# Patient Record
Sex: MALE | Race: OTHER | NOT HISPANIC OR LATINO | URBAN - METROPOLITAN AREA
[De-identification: names, ages, dates, MRNs, and addresses within clinical notes are randomized per-mention and may not be internally consistent; named-entity substitution may affect disease eponyms.]

---

## 2017-03-14 NOTE — PATIENT DISCUSSION
1.  Corneal foreign body OS:    piece of metal and rust ring present centrally. .  Risks benefits alternatives to foreign body removal was discussed with the patient. Embedded material was removed from the cornea with a combination of a FB spud and [place] to remove rust.      Besivance instilled in office and Pressure patch with bacitracin rios applied. Keep patch on for nite and remove in AM and start Besivance every 2 yrs. RTN 1 day OC.

## 2017-03-15 NOTE — PATIENT DISCUSSION
1.  Corneal abasion centrally from FB removal.  1 edema. Cont with Besivance q3h and Rx lotemax tid OS and no rubbing. Tears alot Pres free tears every 2-3 hrs.   2.  RTN 4 days OC

## 2017-03-20 NOTE — PATIENT DISCUSSION
1.  Corneal abasion centrally from FB removal.  2 edema. Needs to start Besivance q3h and Rx pred forte tid OS and no rubbing. Tears alot Pres free tears every 2-3 hrs. Called meds into pharmacy again. PT will call if he cannot get them. 2.  RTN 4 days OC3/21/17  Called 2 meds into DR DIEUDONNE AGUILERA [facility] rd [phone].   12pm pjo

## 2017-03-29 NOTE — PATIENT DISCUSSION
1.  Corneal abrasion centrally from FB removal.  1-2 edema. Vision has dropped. CPT can dc Besivance and cont  pred forte qid OS and no rubbing. Tears alot Pres free tears every 2-3 hrs. Pt not using since he says it does not help. Pt has only used the pred forte for 3-4 days. Says he could not get them from the pharmacy.   2.  RTN 10 days OC- if no better dilate and refer to Cornea spec

## 2017-04-07 NOTE — PATIENT DISCUSSION
1.  Corneal defect centrally from FB removal.  edema. Vision is affected by edema. Cont  pred forte qid OS and no rubbing. Tears alot Pres free tears every 2-3 hrs. Pt not using since he says it does not help. Pt says he had a hard time getting  pred forte from pharmacy so did not start it promptly.   Refer to DR Ruby Rivera for eval of cornea

## 2017-05-25 NOTE — PATIENT DISCUSSION
1.  Central Corneal Scar OS -  Restart PRED 1% 3x/d. Hopefully scar will fade and shape will improve over time may take months. Transplant not indicated at this time. T/c diagnostic RGP fit if BSCVA does not improve. 2. Return for an appointment in 3 weeks for office call. MRx and Topography. with Dr. Denver Curb.

## 2017-06-15 NOTE — PATIENT DISCUSSION
Central Corneal Scar OS -  Improving on drops induced astigmatism from scar will need glasses or CL for best vision. Decrease PRED to 2x/d and stop in a monthEyelash caught in conjunctiva OS causing irritation removed. Return for an appointment in 1 month for office call. MRx and Topography. with Dr. Gómez Nicholas.

## 2021-04-27 ENCOUNTER — IMPORTED ENCOUNTER (OUTPATIENT)
Dept: URBAN - METROPOLITAN AREA CLINIC 1 | Facility: CLINIC | Age: 56
End: 2021-04-27

## 2021-04-27 PROBLEM — H52.13: Noted: 2021-04-27

## 2021-04-27 PROBLEM — H52.223: Noted: 2021-04-27

## 2021-04-27 PROBLEM — H52.4: Noted: 2021-04-27

## 2021-04-27 PROCEDURE — S0620 ROUTINE OPHTHALMOLOGICAL EXA: HCPCS

## 2021-04-27 NOTE — PATIENT DISCUSSION
1. Myopia OU: Rx was given for correction if indicated and requested. 2. Presbyopia OU3. Astigmatism OU4. Cataract OU - Observe. Return for an appointment in 1 year 36 with Dr. Salbador Corona.

## 2022-04-02 ASSESSMENT — VISUAL ACUITY
OS_SC: J1
OS_SC: 20/20
OD_SC: J1
OD_SC: 20/20

## 2022-04-02 ASSESSMENT — TONOMETRY
OD_IOP_MMHG: 18
OS_IOP_MMHG: 18

## 2022-04-27 ENCOUNTER — ESTABLISHED PATIENT (OUTPATIENT)
Dept: URBAN - METROPOLITAN AREA CLINIC 1 | Facility: CLINIC | Age: 57
End: 2022-04-27

## 2022-04-27 DIAGNOSIS — H52.4: ICD-10-CM

## 2022-04-27 DIAGNOSIS — H52.13: ICD-10-CM

## 2022-04-27 DIAGNOSIS — H52.223: ICD-10-CM

## 2022-04-27 PROCEDURE — 92014 COMPRE OPH EXAM EST PT 1/>: CPT

## 2022-04-27 ASSESSMENT — TONOMETRY
OS_IOP_MMHG: 19
OD_IOP_MMHG: 19

## 2023-05-03 ENCOUNTER — COMPREHENSIVE EXAM (OUTPATIENT)
Dept: URBAN - METROPOLITAN AREA CLINIC 1 | Facility: CLINIC | Age: 58
End: 2023-05-03

## 2023-05-03 DIAGNOSIS — Z01.00: ICD-10-CM

## 2023-05-03 DIAGNOSIS — H52.4: ICD-10-CM

## 2023-05-03 DIAGNOSIS — H52.223: ICD-10-CM

## 2023-05-03 DIAGNOSIS — H52.13: ICD-10-CM

## 2023-05-03 PROCEDURE — 92014 COMPRE OPH EXAM EST PT 1/>: CPT

## 2023-05-03 PROCEDURE — 92015 DETERMINE REFRACTIVE STATE: CPT

## 2023-05-03 ASSESSMENT — VISUAL ACUITY
OD_CC: 20/20-1
OD_SC: J1
OS_SC: J1
OS_CC: 20/20-2

## 2023-05-03 ASSESSMENT — TONOMETRY
OS_IOP_MMHG: 19
OD_IOP_MMHG: 19

## 2024-06-07 ENCOUNTER — COMPREHENSIVE EXAM (OUTPATIENT)
Dept: URBAN - METROPOLITAN AREA CLINIC 1 | Facility: CLINIC | Age: 59
End: 2024-06-07

## 2024-06-07 DIAGNOSIS — H52.4: ICD-10-CM

## 2024-06-07 DIAGNOSIS — H52.223: ICD-10-CM

## 2024-06-07 DIAGNOSIS — H52.13: ICD-10-CM

## 2024-06-07 DIAGNOSIS — Z01.00: ICD-10-CM

## 2024-06-07 PROCEDURE — 92014 COMPRE OPH EXAM EST PT 1/>: CPT

## 2024-06-07 PROCEDURE — 92015 DETERMINE REFRACTIVE STATE: CPT

## 2024-06-07 ASSESSMENT — TONOMETRY
OD_IOP_MMHG: 18
OS_IOP_MMHG: 18

## 2024-06-07 ASSESSMENT — VISUAL ACUITY
OS_CC: 20/20-1
OD_CC: 20/20-1

## 2025-08-27 ENCOUNTER — COMPREHENSIVE EXAM (OUTPATIENT)
Age: 60
End: 2025-08-27

## 2025-08-27 DIAGNOSIS — H52.13: ICD-10-CM

## 2025-08-27 DIAGNOSIS — H52.223: ICD-10-CM

## 2025-08-27 DIAGNOSIS — H52.4: ICD-10-CM

## 2025-08-27 DIAGNOSIS — Z01.00: ICD-10-CM

## 2025-08-27 PROCEDURE — 92014 COMPRE OPH EXAM EST PT 1/>: CPT

## 2025-08-27 PROCEDURE — 92015 DETERMINE REFRACTIVE STATE: CPT
